# Patient Record
(demographics unavailable — no encounter records)

---

## 2025-04-09 NOTE — PHYSICAL EXAM
[FreeTextEntry2] : michi mckeon [Labia Majora] : normal [Labia Minora] : normal [Normal] : normal [Uterine Adnexae] : normal [FreeTextEntry1] : Mild external leukoplakia.

## 2025-04-09 NOTE — HISTORY OF PRESENT ILLNESS
[FreeTextEntry1] : 27-year-old white female  0 here for follow-up visit.  Patient is reporting some vulvar itching and discomfort although denies any discharge.  Denies any pelvic pain or dysuria.  Is sexually active and uses condoms.  Denies any abnormal vaginal discharge.  Patient has a history of PFO and is not a candidate for combination oral contraceptives.  Urine dip is negative today.

## 2025-04-09 NOTE — DISCUSSION/SUMMARY
[FreeTextEntry1] : Vaginal cultures are performed.  Patient has been having this issue for about 6 weeks and I discussed etiology and management.  Prescription for Lotrisone and Diflucan was given.  I discussed that she does have small amount of leukoplakia in the area but she is young for lichen sclerosus or vulvar dystrophy.  If her symptoms are persistent she will follow-up.